# Patient Record
(demographics unavailable — no encounter records)

---

## 2024-10-14 NOTE — PHYSICAL EXAM
[Normal Breath Sounds] : Normal breath sounds [Normal Heart Sounds] : normal heart sounds [Alert] : alert [Oriented to Person] : oriented to person [Oriented to Place] : oriented to place [Oriented to Time] : oriented to time [Calm] : calm [de-identified] : Well-developed, Well-nourished in no acute distress. [de-identified] : Within normal limits. [de-identified] : Within normal limits. [de-identified] : Right arm and foot wounds have fully healed, no infection.  [FreeTextEntry1] : Right forearm - scabs with no open wounds  [de-identified] : Mechanically cleansed with sterile gauze and normal saline. no other treatment  [de-identified] : Right dorsal foot - Callus - no open wounds  [de-identified] : Mechanically cleansed with sterile gauze and normal saline. No other treatment